# Patient Record
Sex: FEMALE | Race: WHITE | NOT HISPANIC OR LATINO
[De-identification: names, ages, dates, MRNs, and addresses within clinical notes are randomized per-mention and may not be internally consistent; named-entity substitution may affect disease eponyms.]

---

## 2022-12-08 ENCOUNTER — NON-APPOINTMENT (OUTPATIENT)
Age: 32
End: 2022-12-08

## 2022-12-08 ENCOUNTER — APPOINTMENT (OUTPATIENT)
Dept: OTOLARYNGOLOGY | Facility: CLINIC | Age: 32
End: 2022-12-08

## 2022-12-08 VITALS — WEIGHT: 160 LBS | HEIGHT: 68 IN | TEMPERATURE: 97.3 F | BODY MASS INDEX: 24.25 KG/M2

## 2022-12-08 PROBLEM — Z00.00 ENCOUNTER FOR PREVENTIVE HEALTH EXAMINATION: Status: ACTIVE | Noted: 2022-12-08

## 2022-12-08 PROCEDURE — 99204 OFFICE O/P NEW MOD 45 MIN: CPT

## 2022-12-08 RX ORDER — DOXYCYCLINE HYCLATE 100 MG/1
100 CAPSULE ORAL
Qty: 30 | Refills: 0 | Status: DISCONTINUED | COMMUNITY
Start: 2022-06-28

## 2022-12-08 RX ORDER — TRETINOIN 0.25 MG/G
0.03 CREAM TOPICAL
Qty: 20 | Refills: 0 | Status: ACTIVE | COMMUNITY
Start: 2022-06-08

## 2022-12-08 RX ORDER — TACROLIMUS 1 MG/G
0.1 OINTMENT TOPICAL
Qty: 30 | Refills: 0 | Status: ACTIVE | COMMUNITY
Start: 2022-06-28

## 2022-12-08 RX ORDER — NEOMYCIN SULFATE, POLYMYXIN B SULFATE, HYDROCORTISONE 3.5; 10000; 1 MG/ML; [USP'U]/ML; MG/ML
1 SOLUTION/ DROPS AURICULAR (OTIC)
Qty: 1 | Refills: 0 | Status: ACTIVE | COMMUNITY
Start: 2022-12-08 | End: 1900-01-01

## 2022-12-08 RX ORDER — PREDNISONE 10 MG/1
10 TABLET ORAL
Qty: 12 | Refills: 0 | Status: ACTIVE | COMMUNITY
Start: 2022-12-08 | End: 1900-01-01

## 2022-12-08 RX ORDER — DOXYCYCLINE 100 MG/1
100 CAPSULE ORAL TWICE DAILY
Qty: 14 | Refills: 0 | Status: ACTIVE | COMMUNITY
Start: 2022-12-08 | End: 1900-01-01

## 2022-12-13 ENCOUNTER — APPOINTMENT (OUTPATIENT)
Dept: OTOLARYNGOLOGY | Facility: CLINIC | Age: 32
End: 2022-12-13

## 2022-12-13 DIAGNOSIS — H65.112 ACUTE AND SUBACUTE ALLERGIC OTITIS MEDIA (MUCOID) (SANGUINOUS) (SEROUS), LEFT EAR: ICD-10-CM

## 2022-12-13 DIAGNOSIS — H60.312 DIFFUSE OTITIS EXTERNA, LEFT EAR: ICD-10-CM

## 2022-12-13 PROCEDURE — 99212 OFFICE O/P EST SF 10 MIN: CPT

## 2022-12-13 NOTE — ASSESSMENT
[FreeTextEntry1] : Resolved infection.\par Is safe for her to have air travel.\par She will discontinue using the eardrops.\par She will complete her other oral medication.\par Follow-up on an as-needed basis.

## 2022-12-13 NOTE — HISTORY OF PRESENT ILLNESS
[de-identified] : Initial visit.\par Her chief complaint is "ear pain".\par She reports that she developed ear pain last evening and woke up this morning and had excruciating pain.  Has decreased hearing.  Went to urgent care this morning.\par She does not have a history of chronic ear problems. [FreeTextEntry1] : \par \par 12/13/2022 \par \par Reports that she is almost feeling back to normal.\par She has been dropped, oral antibiotics and oral steroids.